# Patient Record
Sex: FEMALE | Race: WHITE
[De-identification: names, ages, dates, MRNs, and addresses within clinical notes are randomized per-mention and may not be internally consistent; named-entity substitution may affect disease eponyms.]

---

## 2021-02-12 ENCOUNTER — HOSPITAL ENCOUNTER (OUTPATIENT)
Dept: HOSPITAL 60 - LB.SDS | Age: 51
Discharge: HOME | End: 2021-02-12
Attending: SURGERY
Payer: MEDICAID

## 2021-02-12 DIAGNOSIS — Z12.11: Primary | ICD-10-CM

## 2021-02-12 NOTE — OR
DATE OF OPERATION:  02/12/2021

 

SURGEON:  Anmol Brown MD

 

PREOPERATIVE DIAGNOSIS:  Screening colonoscopy.

 

POSTOPERATIVE DIAGNOSIS:  Screening colonoscopy.

 

PROCEDURE:  Colonoscopy.

 

ANESTHESIA:  MAC.

 

ESTIMATED BLOOD LOSS:  None.

 

COMPLICATIONS:  None.

 

INDICATION FOR THE PROCEDURE:  The patient is a 50-year-old female here today for her first

screening colonoscopy.  She denies any family history of colon cancer.  She denies any

change in bowel habits.

 

DESCRIPTION OF PROCEDURE:  Informed consent was obtained from the patient.  The patient was

taken to the operating room, placed on table in left lateral decubitus position.  Monitored

anesthesia care was administered.  Colonoscope was then advanced through the anus directed

towards the cecum.  Cecum was reached and identified by appendiceal orifice.  The

colonoscope was then slowly withdrawn.  No masses.  No polyps.  No areas of ischemia or

inflammation identified.  Rectum was also otherwise unremarkable.  Colonoscope was then

withdrawn.

 

FINDINGS:  Normal colon.

 

RECOMMENDATIONS:  Would recommend repeat screening colonoscopy in 10 years.

 

 

MN/MARIA GUADALUPE

DD:  02/12/2021 16:28:26

DT:  02/12/2021 17:20:02

Job #:  [id number]